# Patient Record
Sex: FEMALE | Race: WHITE | NOT HISPANIC OR LATINO | URBAN - METROPOLITAN AREA
[De-identification: names, ages, dates, MRNs, and addresses within clinical notes are randomized per-mention and may not be internally consistent; named-entity substitution may affect disease eponyms.]

---

## 2017-12-12 ENCOUNTER — APPOINTMENT (RX ONLY)
Dept: URBAN - METROPOLITAN AREA CLINIC 12 | Facility: CLINIC | Age: 32
Setting detail: DERMATOLOGY
End: 2017-12-12

## 2017-12-12 DIAGNOSIS — D22 MELANOCYTIC NEVI: ICD-10-CM

## 2017-12-12 DIAGNOSIS — Z71.89 OTHER SPECIFIED COUNSELING: ICD-10-CM

## 2017-12-12 DIAGNOSIS — D18.0 HEMANGIOMA: ICD-10-CM

## 2017-12-12 DIAGNOSIS — L81.4 OTHER MELANIN HYPERPIGMENTATION: ICD-10-CM

## 2017-12-12 DIAGNOSIS — L21.8 OTHER SEBORRHEIC DERMATITIS: ICD-10-CM

## 2017-12-12 PROBLEM — Z85.3 PERSONAL HISTORY OF MALIGNANT NEOPLASM OF BREAST: Status: ACTIVE | Noted: 2017-12-12

## 2017-12-12 PROBLEM — F32.9 MAJOR DEPRESSIVE DISORDER, SINGLE EPISODE, UNSPECIFIED: Status: ACTIVE | Noted: 2017-12-12

## 2017-12-12 PROBLEM — D22.39 MELANOCYTIC NEVI OF OTHER PARTS OF FACE: Status: ACTIVE | Noted: 2017-12-12

## 2017-12-12 PROBLEM — D18.01 HEMANGIOMA OF SKIN AND SUBCUTANEOUS TISSUE: Status: ACTIVE | Noted: 2017-12-12

## 2017-12-12 PROBLEM — F41.9 ANXIETY DISORDER, UNSPECIFIED: Status: ACTIVE | Noted: 2017-12-12

## 2017-12-12 PROBLEM — I10 ESSENTIAL (PRIMARY) HYPERTENSION: Status: ACTIVE | Noted: 2017-12-12

## 2017-12-12 PROBLEM — L70.0 ACNE VULGARIS: Status: ACTIVE | Noted: 2017-12-12

## 2017-12-12 PROBLEM — L30.9 DERMATITIS, UNSPECIFIED: Status: ACTIVE | Noted: 2017-12-12

## 2017-12-12 PROBLEM — E78.5 HYPERLIPIDEMIA, UNSPECIFIED: Status: ACTIVE | Noted: 2017-12-12

## 2017-12-12 PROBLEM — L29.8 OTHER PRURITUS: Status: ACTIVE | Noted: 2017-12-12

## 2017-12-12 PROBLEM — D22.5 MELANOCYTIC NEVI OF TRUNK: Status: ACTIVE | Noted: 2017-12-12

## 2017-12-12 PROCEDURE — ? SHAVE REMOVAL

## 2017-12-12 PROCEDURE — 99214 OFFICE O/P EST MOD 30 MIN: CPT | Mod: 25

## 2017-12-12 PROCEDURE — ? TREATMENT REGIMEN

## 2017-12-12 PROCEDURE — 11300 SHAVE SKIN LESION 0.5 CM/<: CPT

## 2017-12-12 PROCEDURE — ? IN-HOUSE DISPENSING PHARMACY

## 2017-12-12 PROCEDURE — ? COUNSELING

## 2017-12-12 ASSESSMENT — LOCATION DETAILED DESCRIPTION DERM
LOCATION DETAILED: RIGHT INFERIOR ANTERIOR NECK
LOCATION DETAILED: EPIGASTRIC SKIN
LOCATION DETAILED: MID-OCCIPITAL SCALP
LOCATION DETAILED: LEFT RIB CAGE
LOCATION DETAILED: INFERIOR THORACIC SPINE
LOCATION DETAILED: LEFT INFERIOR CENTRAL MALAR CHEEK

## 2017-12-12 ASSESSMENT — LOCATION ZONE DERM
LOCATION ZONE: TRUNK
LOCATION ZONE: FACE
LOCATION ZONE: SCALP
LOCATION ZONE: NECK

## 2017-12-12 ASSESSMENT — LOCATION SIMPLE DESCRIPTION DERM
LOCATION SIMPLE: LEFT CHEEK
LOCATION SIMPLE: RIGHT ANTERIOR NECK
LOCATION SIMPLE: ABDOMEN
LOCATION SIMPLE: UPPER BACK
LOCATION SIMPLE: POSTERIOR SCALP

## 2017-12-12 NOTE — PROCEDURE: TREATMENT REGIMEN
Detail Level: Detailed
Initiate Treatment: Apply In-house scalp oil at night for 1 week then taper off and sleep with shower cap for better penetration to scalp.

## 2017-12-12 NOTE — PROCEDURE: MIPS QUALITY
Quality 110: Preventive Care And Screening: Influenza Immunization: Influenza Immunization previously received during influenza season
Quality 130: Documentation Of Current Medications In The Medical Record: Current Medications Documented
Detail Level: Generalized

## 2017-12-12 NOTE — PROCEDURE: IN-HOUSE DISPENSING PHARMACY
Product 49 Units Dispensed: 0
Product 12 Unit Type: ml
Product 61 Unit Type: mg
Product 12 Refills: 2
Name Of Product 4: Brandon 0.1% Cream - (953069) \\nNiacinamide 4% - Tazoratene 0.1%
Product 15 Price/Unit (In Dollars): 35.00
Product 23 Unit Type: grams
Product 16 Refills: 3
Name Of Product 18: Wart Gel - (013322) \\nCimetidine 5% - Ibuprofen 2% - Salicylic Acid 17%
Product 5 Amount/Unit (Numbers Only): 30
Product 5 Application Directions: Apply topically to affected area's QD - BID
Product 8 Amount/Unit (Numbers Only): 30
Send Charges To Patient Encounter: Yes
Product 7 Refills: 4
Name Of Product 12: Clob Solution - (060344) \\nClobetasol Propionate 0.05% - Niacinamide 4%
Product 17 Application Directions: Apply topically to face QD
Product 8 Price/Unit (In Dollars): 45.00
Product 4 Application Directions: Apply a pea-sized amount to face at bedtime
Name Of Product 2: Tret 0.05% Cream - ([id number])\\n Niacinamide 4% - Tretinoin 0.05%
Product 7 Application Directions: Apply topically to face Qhs
Name Of Product 5: Acne Gel with Dapsone  ([id number])\\nDapsone 8.5% - Niacinamide 2% - Spironolactone 5%
Product 31 Refills: 1
Name Of Product 7: Acne Gel  ([id number])\\nAdapalene 0.3% - Benzoyl Peroxide 2.5% - Niacinamide - 4%
Product 16 Amount/Unit (Numbers Only): 60
Product 14 Amount/Unit (Numbers Only): 60
Product 2 Price/Unit (In Dollars): 40.00
Name Of Product 17: Rosacea Cream - ([id number])\\nIvermectin 1% - Metronidazole 1% - Niacinamide 4% - Potassium Azeloyl Diglycinate 5%
Name Of Product 15: Triam Dermatitis Cream - (874510) \\nNiacinamide 4% - Triamcinolone Acetonide 0.1%
Product 3 Application Directions: Apply a pea sized amount for entire face at bed time.
Name Of Product 10: Antibacterial Wound Ointment  (551684) \\nAloe Vera 0.2% - Lidocaine 2% - Mupirocin 2%-Tranilast 1%
Product 13 Application Directions: Apply to affected areas as directed.
Name Of Product 16: Fluocinolone Scalp & Body Oil - (300530) \\nFluocinolone Acetonide 0.01% In Emu and Olive Oil
Product 2 Refills: 6
Detail Level: Zone
Product 12 Application Directions: Apply to affected areas bid for up to 3 weeks then stop using 10-14 days before reusing.\\nDo not apply to face, armpit, or groin areas.
Name Of Product 1: Tret 0.025% Cream - (324779)\\n Niacinamide 4% - Tretinoin 0.025%
Name Of Product 6: Acne Gel Combo   ([id number])\\nBenzoyl Peroxide 5% - Clindamycin 1% - Niacinamide 4%
Name Of Product 13: Clob Ointment - ([id number]) \\nClobetasol Propionate 0.05% - Niacinamide 4%
Product 18 Application Directions: Apply to warts then occlusion at bedtime
Product 9 Amount/Unit (Numbers Only): 15
Product 15 Application Directions: Use bid for 2 weeks then stop using for 10-14 days before reusing
Product 8 Application Directions: Apply topically to affected area's qd 3 days a week for 6 weeks
Product 16 Application Directions: Apply topically to affected area bid for 2 weeks then once per day for a week then discontinue
Name Of Product 9: Anti-Fungal Nail Solution (844027) \\nCiclopirox 8% - Fluconazole 1% - Terbinafine 1%
Product 2 Application Directions: Apply a pea size amount to face every night
Name Of Product 14: Clob Cream - ([id number]) \\nClobetasol Propionate 0.05% - Niacinamide 4%
Product 10 Application Directions: Apply to wound BID X 1-2 weeks
Product 10 Price/Unit (In Dollars): 20.00
Name Of Product 11: Desonide Dermatitis Cream - ([id number]) \\nDesonide 0.05% - Niacinamide 4%
Name Of Product 3: Tret 0.1% Cream - ([id number])\\nNiacinamide 4% - Tretinoin 0.1%
Name Of Product 8: Actinic Keratosis Gel  ([id number])\\nImiquimod 5% - Levocetirizine Dihydrochloride 1% - Niacinamide 2%
Product 11 Application Directions: Apply topically to  affected area's QD-BID
Product 14 Application Directions: Apply bid to affected areas for up to 3 weeks then stop using 10-14 days before reusing.
Product 9 Application Directions: Apply to affected toenails QD for 1 year.

## 2017-12-12 NOTE — PROCEDURE: SHAVE REMOVAL
Post-Care Instructions: I reviewed with the patient in detail post-care instructions. Patient is to keep the biopsy site dry overnight, and then apply bacitracin twice daily until healed. Patient may apply hydrogen peroxide soaks to remove any crusting.
X Size Of Lesion In Cm (Optional): 0
Bill For Surgical Tray: no
Biopsy Method: Dermablade
Billing Type: Third-Party Bill
Wound Care: Vaseline
Lab: [id number]
Anesthesia Volume In Cc: 0.5
Detail Level: Detailed
Notification Instructions: Patient will be notified of biopsy results. However, patient instructed to call the office if not contacted within 2 weeks.
Medical Necessity Information: It is in your best interest to select a reason for this procedure from the list below. All of these items fulfill various CMS LCD requirements except the new and changing color options.
Consent was obtained from the patient. The risks and benefits to therapy were discussed in detail. Specifically, the risks of infection, scarring, bleeding, prolonged wound healing, incomplete removal, allergy to anesthesia, nerve injury and recurrence were addressed. Prior to the procedure, the treatment site was clearly identified and confirmed by the patient. All components of Universal Protocol/PAUSE Rule completed.
Anesthesia Type: 1% lidocaine with epinephrine
Hemostasis: Electrocautery
Path Notes (To The Dermatopathologist): Please check margins.

## 2018-02-09 NOTE — HPI: EVALUATION OF SKIN LESION(S)
How Severe Are Your Spot(S)?: mild
Have Your Spot(S) Been Treated In The Past?: has not been treated
Hpi Title: Evaluation of Skin Lesions
Ears: no ear pain and no hearing problems.Nose: no nasal congestion and no nasal drainage.Mouth/Throat: no dysphagia, no hoarseness and no throat pain.Neck: no lumps, no pain, no stiffness and no swollen glands.